# Patient Record
Sex: MALE | Race: WHITE | NOT HISPANIC OR LATINO | URBAN - METROPOLITAN AREA
[De-identification: names, ages, dates, MRNs, and addresses within clinical notes are randomized per-mention and may not be internally consistent; named-entity substitution may affect disease eponyms.]

---

## 2020-01-15 VITALS
OXYGEN SATURATION: 96 % | SYSTOLIC BLOOD PRESSURE: 114 MMHG | DIASTOLIC BLOOD PRESSURE: 74 MMHG | WEIGHT: 210.32 LBS | HEART RATE: 61 BPM | RESPIRATION RATE: 16 BRPM | HEIGHT: 70 IN | TEMPERATURE: 97 F

## 2020-01-15 NOTE — ASU PATIENT PROFILE, ADULT - PMH
GERD (gastroesophageal reflux disease)    HLD (hyperlipidemia)    Inguinal hernia  bilateral  STEPHANIE (obstructive sleep apnea)  mouthguard

## 2020-01-16 ENCOUNTER — OUTPATIENT (OUTPATIENT)
Dept: OUTPATIENT SERVICES | Facility: HOSPITAL | Age: 50
LOS: 1 days | Discharge: HOME | End: 2020-01-16
Payer: COMMERCIAL

## 2020-01-16 VITALS
RESPIRATION RATE: 20 BRPM | DIASTOLIC BLOOD PRESSURE: 72 MMHG | HEART RATE: 64 BPM | OXYGEN SATURATION: 94 % | SYSTOLIC BLOOD PRESSURE: 119 MMHG

## 2020-01-16 DIAGNOSIS — Z98.890 OTHER SPECIFIED POSTPROCEDURAL STATES: Chronic | ICD-10-CM

## 2020-01-16 PROCEDURE — C1781: CPT

## 2020-01-16 PROCEDURE — 49650 LAP ING HERNIA REPAIR INIT: CPT | Mod: 50

## 2020-01-16 PROCEDURE — 88302 TISSUE EXAM BY PATHOLOGIST: CPT | Mod: 26

## 2020-01-16 PROCEDURE — 88302 TISSUE EXAM BY PATHOLOGIST: CPT

## 2020-01-16 NOTE — BRIEF OPERATIVE NOTE - NSICDXBRIEFPROCEDURE_GEN_ALL_CORE_FT
PROCEDURES:  Laparoscopic repair of bilateral inguinal hernias with mesh 16-Jan-2020 12:30:43  Joe Reece

## 2020-01-16 NOTE — BRIEF OPERATIVE NOTE - OPERATION/FINDINGS
Umbilical vertical incision, posterior rectus sheath was identified, pre peritoneal space was entered.   Dissection with dissecting balloon.   Starting on the left side, a hernia was identified in the direct space, which included some chidi vesical fat. Hernia sac was reduced. Cord structures were skeletonized. Vas deferent was identified and preserved.   On the left a indirect hernia was identified including chidi vesicular fat as well. Cord structure skelotonized, hernia sac reduced and Vas deferent preserved.   MESH were place bilaterally and tacked, we carefully observed that the hernia content was on the right side of the MESH    Hemostasis was ensured      Fascia on the umbilical incision was closed

## 2020-01-21 LAB — SURGICAL PATHOLOGY STUDY: SIGNIFICANT CHANGE UP

## 2020-03-31 NOTE — ASU PREOP CHECKLIST - WAS PATIENT ON BETA BLOCKER?
[Follow Up] : a follow up visit
[Patient] : patient
[Mother] : mother
[FreeTextEntry1] : left ankle pain after fall
No
Migraine headaches and hearing loss

## 2023-11-15 NOTE — ASU PATIENT PROFILE, ADULT - MEDICATION ADMINISTRATION INFO, PROFILE
no concerns Doxepin Counseling:  Patient advised that the medication is sedating and not to drive a car after taking this medication. Patient informed of potential adverse effects including but not limited to dry mouth, urinary retention, and blurry vision.  The patient verbalized understanding of the proper use and possible adverse effects of doxepin.  All of the patient's questions and concerns were addressed.